# Patient Record
Sex: MALE | URBAN - METROPOLITAN AREA
[De-identification: names, ages, dates, MRNs, and addresses within clinical notes are randomized per-mention and may not be internally consistent; named-entity substitution may affect disease eponyms.]

---

## 2021-05-28 ENCOUNTER — OFFICE VISIT (OUTPATIENT)
Dept: URBAN - METROPOLITAN AREA CLINIC 13 | Facility: CLINIC | Age: 31
End: 2021-05-28
Payer: COMMERCIAL

## 2021-05-28 PROCEDURE — 92134 CPTRZ OPH DX IMG PST SGM RTA: CPT | Performed by: OPHTHALMOLOGY

## 2021-05-28 PROCEDURE — 99213 OFFICE O/P EST LOW 20 MIN: CPT | Performed by: OPHTHALMOLOGY

## 2021-05-28 RX ORDER — DUREZOL 0.5 MG/ML
0.05 % EMULSION OPHTHALMIC
Qty: 5 | Refills: 12 | Status: INACTIVE
Start: 2021-05-28 | End: 2022-05-27

## 2021-05-28 ASSESSMENT — INTRAOCULAR PRESSURE
OD: 15
OS: 17

## 2021-05-28 NOTE — IMPRESSION/PLAN
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
- developing recurrent infections and has not yet been able to start Humira; unable to tolerate Mobic (NSAID) - history of recurrent inflammation when oral pred tapered below 20mg
- off Durzeol ~5/2018 -> recurrence 8/3/18
- improved inflammation systemically after d/c alcohol consumption. Plan: Pt presents emergently c/o blurred VA OD. 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 OCT shows no CME. Start Durezol QID, cont Combigan.   

1-2 weeks with SI

## 2021-06-11 ENCOUNTER — OFFICE VISIT (OUTPATIENT)
Dept: URBAN - METROPOLITAN AREA CLINIC 41 | Facility: CLINIC | Age: 31
End: 2021-06-11
Payer: COMMERCIAL

## 2021-06-11 DIAGNOSIS — H44.111 PANUVEITIS, RIGHT EYE: Primary | ICD-10-CM

## 2021-06-11 DIAGNOSIS — Z96.1 PRESENCE OF INTRAOCULAR LENS: ICD-10-CM

## 2021-06-11 PROCEDURE — 92012 INTRM OPH EXAM EST PATIENT: CPT | Performed by: OPHTHALMOLOGY

## 2021-06-11 PROCEDURE — 92134 CPTRZ OPH DX IMG PST SGM RTA: CPT | Performed by: OPHTHALMOLOGY

## 2021-06-11 ASSESSMENT — INTRAOCULAR PRESSURE
OD: 24
OS: 19

## 2021-06-11 NOTE — IMPRESSION/PLAN
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
- developing recurrent infections and has not yet been able to start Humira; unable to tolerate Mobic (NSAID) - history of recurrent inflammation when oral pred tapered below 20mg
- off Durzeol ~5/2018 -> recurrence 8/3/18
- improved inflammation systemically after d/c alcohol consumption. Plan: Pt presents emergently c/o blurred VA OD. 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 OCT shows no CME. Start Durezol QID, cont Combigan.   

RTC: 5-6 weeks OCT OU

## 2022-07-12 ENCOUNTER — OFFICE VISIT (OUTPATIENT)
Dept: URBAN - METROPOLITAN AREA CLINIC 13 | Facility: CLINIC | Age: 32
End: 2022-07-12
Payer: COMMERCIAL

## 2022-07-12 DIAGNOSIS — H44.111 PANUVEITIS, RIGHT EYE: Primary | ICD-10-CM

## 2022-07-12 DIAGNOSIS — Z96.1 PRESENCE OF INTRAOCULAR LENS: ICD-10-CM

## 2022-07-12 PROCEDURE — 99214 OFFICE O/P EST MOD 30 MIN: CPT | Performed by: OPHTHALMOLOGY

## 2022-07-12 PROCEDURE — 92134 CPTRZ OPH DX IMG PST SGM RTA: CPT | Performed by: OPHTHALMOLOGY

## 2022-07-12 ASSESSMENT — INTRAOCULAR PRESSURE
OD: 14
OS: 15

## 2022-07-12 NOTE — IMPRESSION/PLAN
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
- developing recurrent infections and has not yet been able to start Humira; unable to tolerate Mobic (NSAID) - history of recurrent inflammation when oral pred tapered below 20mg - Pt on Durezol 8x/day x few weeks; Combigan, Latanoprost, Dorzolamide
-not on any Prednisone currently OCT: no ME OU  Plan: Pt presents urgently with reduced vision OD x few weeks. Increased inflammation OD today. Pt previously responded to Ozurdex/Prednisone. Will start pt on Prednisone 60mg qday (pt defers- he rather wait for Ozurdex) RTC 1 week Ozurdex OD

## 2022-07-15 ENCOUNTER — PROCEDURE (OUTPATIENT)
Dept: URBAN - METROPOLITAN AREA CLINIC 13 | Facility: CLINIC | Age: 32
End: 2022-07-15
Payer: COMMERCIAL

## 2022-07-15 DIAGNOSIS — H30.891 OTHER CHORIORETINAL INFLAMMATIONS, RIGHT EYE: Primary | ICD-10-CM

## 2022-07-15 PROCEDURE — 67028 INJECTION EYE DRUG: CPT | Performed by: OPHTHALMOLOGY

## 2022-07-15 RX ORDER — DUREZOL 0.5 MG/ML
0.05 % EMULSION OPHTHALMIC
Qty: 5 | Refills: 3 | Status: ACTIVE
Start: 2022-07-15

## 2022-07-15 ASSESSMENT — INTRAOCULAR PRESSURE
OS: 17
OD: 13

## 2022-08-02 ENCOUNTER — OFFICE VISIT (OUTPATIENT)
Dept: URBAN - METROPOLITAN AREA CLINIC 13 | Facility: CLINIC | Age: 32
End: 2022-08-02
Payer: COMMERCIAL

## 2022-08-02 DIAGNOSIS — H30.891 OTHER CHORIORETINAL INFLAMMATIONS, RIGHT EYE: ICD-10-CM

## 2022-08-02 DIAGNOSIS — H44.111 PANUVEITIS, RIGHT EYE: Primary | ICD-10-CM

## 2022-08-02 DIAGNOSIS — Z96.1 PRESENCE OF INTRAOCULAR LENS: ICD-10-CM

## 2022-08-02 PROCEDURE — 92012 INTRM OPH EXAM EST PATIENT: CPT | Performed by: OPHTHALMOLOGY

## 2022-08-02 PROCEDURE — 92134 CPTRZ OPH DX IMG PST SGM RTA: CPT | Performed by: OPHTHALMOLOGY

## 2022-08-02 ASSESSMENT — INTRAOCULAR PRESSURE
OD: 15
OS: 21

## 2022-08-02 NOTE — IMPRESSION/PLAN
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
- developing recurrent infections and has not yet been able to start Humira; unable to tolerate Mobic (NSAID) - history of recurrent inflammation when oral pred tapered below 20mg - Pt on Durezol 8x/day x few weeks; Combigan, Latanoprost, Dorzolamide
-not on any Prednisone currently
-s/p Ozurdex last 07/15/2022-KNJ, 

OCT: no ME OU Plan: Exam and OCT demonstrate improved inflammation after Ouzrdex. Vision significantly improved. 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  Taper Durezol to BID.  D/C latanoprost

RTC 3 mo OCT OU, poss Ozurdex OD

## 2023-08-28 ENCOUNTER — OFFICE VISIT (OUTPATIENT)
Dept: URBAN - METROPOLITAN AREA CLINIC 41 | Facility: CLINIC | Age: 33
End: 2023-08-28
Payer: COMMERCIAL

## 2023-08-28 DIAGNOSIS — H30.891 OTHER CHORIORETINAL INFLAMMATIONS, RIGHT EYE: ICD-10-CM

## 2023-08-28 DIAGNOSIS — H44.111 PANUVEITIS, RIGHT EYE: Primary | ICD-10-CM

## 2023-08-28 DIAGNOSIS — Z96.1 PRESENCE OF INTRAOCULAR LENS: ICD-10-CM

## 2023-08-28 PROCEDURE — 99214 OFFICE O/P EST MOD 30 MIN: CPT | Performed by: OPHTHALMOLOGY

## 2023-08-28 PROCEDURE — 92134 CPTRZ OPH DX IMG PST SGM RTA: CPT | Performed by: OPHTHALMOLOGY

## 2023-08-28 RX ORDER — DIFLUPREDNATE 0.5 MG/ML
0.05 % EMULSION OPHTHALMIC
Qty: 5 | Refills: 12 | Status: INACTIVE
Start: 2023-08-28 | End: 2024-08-28

## 2023-08-28 RX ORDER — DIFLUPREDNATE 0.5 MG/ML
0.05 % EMULSION OPHTHALMIC
Qty: 5 | Refills: 12 | Status: INACTIVE
Start: 2023-08-28 | End: 2023-08-28

## 2023-08-28 RX ORDER — BRIMONIDINE TARTRATE, TIMOLOL MALEATE 2; 5 MG/ML; MG/ML
SOLUTION/ DROPS OPHTHALMIC
Qty: 5 | Refills: 12 | Status: ACTIVE
Start: 2023-08-28

## 2024-05-07 ENCOUNTER — OFFICE VISIT (OUTPATIENT)
Dept: URBAN - METROPOLITAN AREA CLINIC 13 | Facility: CLINIC | Age: 34
End: 2024-05-07
Payer: COMMERCIAL

## 2024-05-07 DIAGNOSIS — H30.891 OTHER CHORIORETINAL INFLAMMATIONS, RIGHT EYE: ICD-10-CM

## 2024-05-07 DIAGNOSIS — Z96.1 PRESENCE OF INTRAOCULAR LENS: ICD-10-CM

## 2024-05-07 DIAGNOSIS — H44.111 PANUVEITIS, RIGHT EYE: Primary | ICD-10-CM

## 2024-05-07 PROCEDURE — 92235 FLUORESCEIN ANGRPH MLTIFRAME: CPT | Performed by: OPHTHALMOLOGY

## 2024-05-07 PROCEDURE — 92134 CPTRZ OPH DX IMG PST SGM RTA: CPT | Performed by: OPHTHALMOLOGY

## 2024-05-07 PROCEDURE — 99214 OFFICE O/P EST MOD 30 MIN: CPT | Performed by: OPHTHALMOLOGY

## 2024-05-07 ASSESSMENT — INTRAOCULAR PRESSURE
OD: 16
OS: 15

## 2024-07-02 ENCOUNTER — OFFICE VISIT (OUTPATIENT)
Dept: URBAN - METROPOLITAN AREA CLINIC 13 | Facility: CLINIC | Age: 34
End: 2024-07-02
Payer: COMMERCIAL

## 2024-07-02 DIAGNOSIS — Z96.1 PRESENCE OF INTRAOCULAR LENS: ICD-10-CM

## 2024-07-02 DIAGNOSIS — H44.111 PANUVEITIS, RIGHT EYE: Primary | ICD-10-CM

## 2024-07-02 DIAGNOSIS — H30.891 OTHER CHORIORETINAL INFLAMMATIONS, RIGHT EYE: ICD-10-CM

## 2024-07-02 DIAGNOSIS — H33.311 OPERCULUM OF RETINA W/O DETACHMENT OF RIGHT EYE: ICD-10-CM

## 2024-07-02 PROCEDURE — 92134 CPTRZ OPH DX IMG PST SGM RTA: CPT | Performed by: OPHTHALMOLOGY

## 2024-07-02 PROCEDURE — 92014 COMPRE OPH EXAM EST PT 1/>: CPT | Performed by: OPHTHALMOLOGY

## 2024-07-02 ASSESSMENT — INTRAOCULAR PRESSURE
OD: 13
OS: 10

## 2025-01-28 ENCOUNTER — OFFICE VISIT (OUTPATIENT)
Dept: URBAN - METROPOLITAN AREA CLINIC 13 | Facility: CLINIC | Age: 35
End: 2025-01-28
Payer: COMMERCIAL

## 2025-01-28 DIAGNOSIS — H33.311 OPERCULUM OF RETINA W/O DETACHMENT OF RIGHT EYE: ICD-10-CM

## 2025-01-28 DIAGNOSIS — H44.111 PANUVEITIS, RIGHT EYE: Primary | ICD-10-CM

## 2025-01-28 DIAGNOSIS — H30.891 OTHER CHORIORETINAL INFLAMMATIONS, RIGHT EYE: ICD-10-CM

## 2025-01-28 DIAGNOSIS — Z96.1 PRESENCE OF INTRAOCULAR LENS: ICD-10-CM

## 2025-01-28 PROCEDURE — 99214 OFFICE O/P EST MOD 30 MIN: CPT | Performed by: OPHTHALMOLOGY

## 2025-01-28 PROCEDURE — 92134 CPTRZ OPH DX IMG PST SGM RTA: CPT | Performed by: OPHTHALMOLOGY

## 2025-01-28 RX ORDER — LATANOPROST 50 UG/ML
0.005 % SOLUTION OPHTHALMIC
Qty: 5 | Refills: 3 | Status: INACTIVE
Start: 2025-01-28 | End: 2025-04-27

## 2025-01-28 RX ORDER — DIFLUPREDNATE 0.5 MG/ML
0.05 % EMULSION OPHTHALMIC
Qty: 5 | Refills: 0 | Status: ACTIVE
Start: 2025-01-28

## 2025-01-28 ASSESSMENT — INTRAOCULAR PRESSURE
OS: 14
OD: 39